# Patient Record
Sex: FEMALE | Race: BLACK OR AFRICAN AMERICAN | NOT HISPANIC OR LATINO | URBAN - METROPOLITAN AREA
[De-identification: names, ages, dates, MRNs, and addresses within clinical notes are randomized per-mention and may not be internally consistent; named-entity substitution may affect disease eponyms.]

---

## 2021-09-11 ENCOUNTER — EMERGENCY (EMERGENCY)
Facility: HOSPITAL | Age: 41
LOS: 1 days | Discharge: HOME | End: 2021-09-11
Attending: EMERGENCY MEDICINE
Payer: COMMERCIAL

## 2021-09-11 VITALS
DIASTOLIC BLOOD PRESSURE: 68 MMHG | OXYGEN SATURATION: 99 % | HEIGHT: 64 IN | HEART RATE: 77 BPM | WEIGHT: 164.91 LBS | SYSTOLIC BLOOD PRESSURE: 106 MMHG | TEMPERATURE: 99 F | RESPIRATION RATE: 18 BRPM

## 2021-09-11 PROCEDURE — 90715 TDAP VACCINE 7 YRS/> IM: CPT

## 2021-09-11 PROCEDURE — 99283 EMERGENCY DEPT VISIT LOW MDM: CPT | Mod: 25

## 2021-09-11 PROCEDURE — 90471 IMMUNIZATION ADMIN: CPT

## 2021-09-11 PROCEDURE — 12011 RPR F/E/E/N/L/M 2.5 CM/<: CPT

## 2021-09-11 RX ORDER — IBUPROFEN 200 MG
600 TABLET ORAL ONCE
Refills: 0 | Status: COMPLETED | OUTPATIENT
Start: 2021-09-11 | End: 2021-09-11

## 2021-09-11 RX ORDER — AMOXICILLIN 250 MG/5ML
1 SUSPENSION, RECONSTITUTED, ORAL (ML) ORAL
Qty: 10 | Refills: 0
Start: 2021-09-11 | End: 2021-09-15

## 2021-09-11 RX ORDER — TETANUS TOXOID, REDUCED DIPHTHERIA TOXOID AND ACELLULAR PERTUSSIS VACCINE, ADSORBED 5; 2.5; 8; 8; 2.5 [IU]/.5ML; [IU]/.5ML; UG/.5ML; UG/.5ML; UG/.5ML
0.5 SUSPENSION INTRAMUSCULAR ONCE
Refills: 0 | Status: COMPLETED | OUTPATIENT
Start: 2021-09-11 | End: 2021-09-11

## 2021-09-11 RX ADMIN — Medication 600 MILLIGRAM(S): at 15:44

## 2021-09-11 RX ADMIN — TETANUS TOXOID, REDUCED DIPHTHERIA TOXOID AND ACELLULAR PERTUSSIS VACCINE, ADSORBED 0.5 MILLILITER(S): 5; 2.5; 8; 8; 2.5 SUSPENSION INTRAMUSCULAR at 16:03

## 2021-09-11 NOTE — ED PROVIDER NOTE - NS ED ROS FT
HEENT: No HA, no vision changes, +facial trauma  Chest: no chest wall pain, no dyspnea  GI: No abdominal pain, no nausea, no vomiting  Neuro: No LOC, no paresthesias, no weakness  MSK: No msk pain, no swelling  Skin: +lacerations, no ecchymosis  ROS otherwise negative except as noted in HPI

## 2021-09-11 NOTE — ED PROCEDURE NOTE - PROCEDURE ADDITIONAL DETAILS
1cm laceration superior to R lip, 1cm laceration interior of R lip. Anesthetized locally with lidocaine, copiously irrigated, exterior lac closed with 2 simple interrupted stitches of 6-0 prolene. Interior R lip lac closed w/ 2 simple interrupted stitches of 5-0 chromic gut.

## 2021-09-11 NOTE — ED PROVIDER NOTE - PHYSICAL EXAMINATION
General: well appearing, NAD  Head: NC/AT  ENT: 1cm laceration superior to R lip, 1cm laceration interior of R lip  Cardiac: RRR  Respiratory: equal chest wall expansions, no conversational dyspnea  Neuro: AAOx3,coordinated movement of all 4 extremities  Psych: calm, cooperative, normal affect  Skin: warm and dry

## 2021-09-11 NOTE — ED PROVIDER NOTE - NSFOLLOWUPINSTRUCTIONS_ED_ALL_ED_FT
1. Keep sutures dry for 24 hours. After the first day, you can shower but don't soak the sutures (no swimming).   2. Change bandage and apply bacitracin or neosporin every day.   3. Take your antibiotics as prescribed.  4. Return to your doctor or to the emergency room for suture removal (2 sutures) in 5 days.  5. Return to the emergency room for any new or worsening symptoms, such as redness, swelling, pus in the wound.

## 2021-09-11 NOTE — ED PROVIDER NOTE - PATIENT PORTAL LINK FT
You can access the FollowMyHealth Patient Portal offered by [facility] by registering at the following website: [email or web address]. By joining [facility]’s FollowMyHealth portal, you will also be able to view your health information using other applications (apps) compatible with our system.
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
